# Patient Record
Sex: FEMALE | Race: WHITE | NOT HISPANIC OR LATINO | URBAN - METROPOLITAN AREA
[De-identification: names, ages, dates, MRNs, and addresses within clinical notes are randomized per-mention and may not be internally consistent; named-entity substitution may affect disease eponyms.]

---

## 2020-06-30 ENCOUNTER — CLAIMS CREATED FROM THE CLAIM WINDOW (OUTPATIENT)
Dept: URBAN - METROPOLITAN AREA CLINIC 82 | Facility: CLINIC | Age: 80
End: 2020-06-30
Payer: MEDICARE

## 2020-06-30 ENCOUNTER — CLAIMS CREATED FROM THE CLAIM WINDOW (OUTPATIENT)
Dept: URBAN - METROPOLITAN AREA CLINIC 82 | Facility: CLINIC | Age: 80
End: 2020-06-30

## 2020-06-30 ENCOUNTER — OFFICE VISIT (OUTPATIENT)
Dept: URBAN - METROPOLITAN AREA CLINIC 82 | Facility: CLINIC | Age: 80
End: 2020-06-30

## 2020-06-30 ENCOUNTER — DASHBOARD ENCOUNTERS (OUTPATIENT)
Age: 80
End: 2020-06-30

## 2020-06-30 DIAGNOSIS — K63.5 COLON POLYPS: ICD-10-CM

## 2020-06-30 DIAGNOSIS — K57.30 DIVERTICULA OF COLON: ICD-10-CM

## 2020-06-30 DIAGNOSIS — K59.01 CONSTIPATION: ICD-10-CM

## 2020-06-30 DIAGNOSIS — R10.84 ABDOMINAL CRAMPING, GENERALIZED: ICD-10-CM

## 2020-06-30 DIAGNOSIS — R10.9 ABDOMINAL PAIN: ICD-10-CM

## 2020-06-30 DIAGNOSIS — Z86.010 PERSONAL HISTORY OF COLONIC POLYPS: ICD-10-CM

## 2020-06-30 PROBLEM — 14760008 CONSTIPATION: Status: ACTIVE | Noted: 2020-06-30

## 2020-06-30 PROBLEM — 92065004 BENIGN NEOPLASM OF COLON: Status: ACTIVE | Noted: 2020-06-30

## 2020-06-30 PROCEDURE — 99213 OFFICE O/P EST LOW 20 MIN: CPT | Performed by: INTERNAL MEDICINE

## 2020-06-30 RX ORDER — CIPROFLOXACIN HYDROCHLORIDE 500 MG/1
TABLET, FILM COATED ORAL
Qty: 0 | Refills: 0 | Status: DISCONTINUED | COMMUNITY
Start: 2017-12-06

## 2020-06-30 RX ORDER — ASPIRIN 81 MG/1
TABLET, COATED ORAL
Qty: 0 | Refills: 0 | Status: ACTIVE | COMMUNITY
Start: 1900-01-01

## 2020-06-30 RX ORDER — POLYETHYLENE GLYCOL 3350 17 G/17G
1 PACKET MIXED WITH 8 OUNCES OF FLUID POWDER, FOR SOLUTION ORAL ONCE A DAY
Qty: 30 | Refills: 3 | OUTPATIENT
Start: 2020-06-30 | End: 2020-10-28

## 2020-06-30 RX ORDER — AMOXICILLIN 500 MG/1
TABLET, FILM COATED ORAL
Qty: 0 | Refills: 0 | Status: DISCONTINUED | COMMUNITY
Start: 1900-01-01

## 2020-06-30 NOTE — HPI-TODAY'S VISIT:
DRY STOOL, CHECK UP AND FOLLOW UP. 5/23 WENT TO ER, CBC NORMAL, CMP NORMAL, CT SHOWED DIVERTICULOSIS, NO DIVERTICULITIS. HOSPITALIZES FOR FALL, BP WAS ELEVATED. SEEN CARDIOLOGIST. TOOK 10 DAYS, [facility], EVERYTHING WAS GOOD. STOMACH IS REALLY GOOD. DR MARTINEZ CARDIOLOGIST. LBBB. NO NAUSEA OR VOMITING. BM NOT GOOD. DRY BM, TRIED MIRALX AND IT DOES FINE. GAS FROM BELOW, NO BLEEDING, NO FEVER OR WT LOSS.